# Patient Record
Sex: MALE | Race: WHITE
[De-identification: names, ages, dates, MRNs, and addresses within clinical notes are randomized per-mention and may not be internally consistent; named-entity substitution may affect disease eponyms.]

---

## 2021-03-19 ENCOUNTER — HOSPITAL ENCOUNTER (OUTPATIENT)
Dept: HOSPITAL 56 - MW.SDS | Age: 21
Discharge: HOME | End: 2021-03-19
Attending: SURGERY
Payer: SELF-PAY

## 2021-03-19 DIAGNOSIS — K61.0: Primary | ICD-10-CM

## 2021-03-19 DIAGNOSIS — Z98.890: ICD-10-CM

## 2021-03-19 DIAGNOSIS — F17.210: ICD-10-CM

## 2021-03-19 DIAGNOSIS — Z20.822: ICD-10-CM

## 2021-03-19 DIAGNOSIS — Z91.09: ICD-10-CM

## 2021-03-19 DIAGNOSIS — E66.9: ICD-10-CM

## 2021-03-19 DIAGNOSIS — Z01.812: ICD-10-CM

## 2021-03-19 DIAGNOSIS — Z91.018: ICD-10-CM

## 2021-03-19 PROCEDURE — 46050 I&D PERIANAL ABSCESS SUPFC: CPT

## 2021-03-19 PROCEDURE — 87635 SARS-COV-2 COVID-19 AMP PRB: CPT

## 2021-03-19 PROCEDURE — U0002 COVID-19 LAB TEST NON-CDC: HCPCS

## 2021-03-19 NOTE — [id number]
Post Anesthesia Note





- EVALUATION WITHIN 48HRS OF ANESTHETIC


Vital Signs in Normal Range: Yes


Patient Participated in Evaluation: Yes


Respiratory Function Stable: Yes


Airway Patent: Yes


Cardiovascular Function Stable: Yes


Hydration Status Stable: Yes


Pain Control Satisfactory: Yes


Nausea and Vomiting Control Satisfactory: Yes


Mental Status Recovered: Yes


Vital Signs: 


                                Last Vital Signs











Temp  36.2 C   03/19/21 16:45


 


Pulse  79   03/19/21 17:05


 


Resp  19   03/19/21 17:05


 


BP  116/65   03/19/21 17:05


 


Pulse Ox  96   03/19/21 17:05

## 2021-03-19 NOTE — PCM.OPNOTE
- General Post-Op/Procedure Note


Date of Surgery/Procedure: 03/19/21


Operative Procedure(s): Incision and drainage left anterior perianal abscess


Findings: 





3 x 2 x 1 cm perianal abscess on the left anterior anoderm. No evidence of a 

fistula 


Pre Op Diagnosis: Perianal abscess


Post-Op Diagnosis: same


Anesthesia Technique: General LMA


Primary Surgeon: Jaylin Montero


Output, Urine Amount: 800


EBL in mLs: 5


Condition: Good

## 2021-03-19 NOTE — PCM.PREANE
Preanesthetic Assessment





- Anesthesia/Transfusion/Family Hx


Anesthesia History: Prior Anesthesia Without Reaction


Family History of Anesthesia Reaction: No


Transfusion History: No Prior Transfusion(s)





- Physical Assessment


NPO Status Date: 03/19/21


NPO Status Time: 00:05


Vital Signs: 





                                Last Vital Signs











Temp  36.7 C   03/19/21 15:05


 


Pulse  85   03/19/21 15:05


 


Resp  16   03/19/21 15:05


 


BP  142/74 H  03/19/21 15:05


 


Pulse Ox  96   03/19/21 15:05











Height: 1.68 m


Weight: 100.698 kg


ASA Class: 1





- Lab


Values: 





                             Laboratory Last Values











SARS-CoV-2 RNA (KATHLEEN)  NEGATIVE  (NEGATIVE)   03/19/21  15:20    














- Allergies


Allergies/Adverse Reactions: 


                                    Allergies











Allergy/AdvReac Type Severity Reaction Status Date / Time


 


No Known Allergies Allergy   Verified 03/19/21 15:37














- Acknowledgements


Anesthesia Type Planned: General Anesthesia


Pt an Appropriate Candidate for the Planned Anesthesia: Yes


Alternatives and Risks of Anesthesia Discussed w Pt/Guardian: Yes


Pt/Guardian Understands and Agrees with Anesthesia Plan: Yes





PreAnesthesia Questionnaire


Gastrointestinal History: Reports: GERD


Endocrine/Metabolic History: Reports: Obesity/BMI 30+





- Past Surgical History


Head Surgeries/Procedures: Reports: None


HEENT Surgical History: Reports: Tonsillectomy, Other (See Below)


Other HEENT Surgeries/Procedures: Left Tympanoplasty





- SUBSTANCE USE


Tobacco Use Status *Q: Current Every Day Tobacco User


Tobacco Use Within Last Twelve Months: Snuff/Dip


Recreational Drug Use History: No





- HOME MEDS


Home Medications: 


                                    Home Meds





Famotidine [Pepcid AC] 20 mg PO DAILY PRN 03/19/21 [History]


metroNIDAZOLE [Flagyl] 500 mg PO Q8H #15 tab 03/19/21 [Rx]











- CURRENT (IN HOUSE) MEDS


Current Meds: 





                               Current Medications





Lactated Ringer's (Ringers, Lactated)  1,000 mls @ 125 mls/hr IV ASDIRECTED ALICIA


   Last Admin: 03/19/21 16:09 Dose:  125 mls/hr


   Documented by: 


Cefoxitin Sodium 2 gm/ Premix  50 mls @ 100 mls/hr IV ONCALL ALICIA


Sodium Chloride (Sodium Chloride 0.9% 10 Ml Syringe)  10 ml FLUSH ASDIRECTED PRN


   PRN Reason: Keep Vein Open


Sodium Chloride (Sodium Chloride 0.9% 2.5 Ml Syringe)  2.5 ml FLUSH ASDIRECTED 

PRN


   PRN Reason: Keep Vein Open


Sodium Chloride (Sodium Chloride 0.9% 10 Ml Sdv)  10 ml IV ASDIRECTED PRN


   PRN Reason: IV Use





Discontinued Medications





Cefoxitin Sodium (Cefoxitin 1 Gm Vial) Confirm Administered Dose 2 gm .ROUTE 

.STK-MED ONE


   Stop: 03/19/21 15:59


Fentanyl (Fentanyl 100 Mcg/2 Ml Sdv) Confirm Administered Dose 100 mcg .ROUTE 

.STK-MED ONE


   Stop: 03/19/21 15:46


Sodium Chloride (Normal Saline) Confirm Administered Dose 20 mls @ as directed 

.ROUTE .STK-MED ONE


   Stop: 03/19/21 15:59


Midazolam HCl (Midazolam 1 Mg/Ml 2 Ml Sdv) Confirm Administered Dose 2 mg .ROUTE

 .STK-MED ONE


   Stop: 03/19/21 15:46


Propofol (Propofol 200 Mg/20 Ml Sdv) Confirm Administered Dose 200 mg .ROUTE 

.STK-MED ONE


   Stop: 03/19/21 15:46

## 2021-11-23 NOTE — [id number]
Post Anesthesia Note





- EVALUATION WITHIN 48HRS OF ANESTHETIC


Vital Signs in Normal Range: Yes


Patient Participated in Evaluation: Yes


Respiratory Function Stable: Yes


Airway Patent: Yes


Cardiovascular Function Stable: Yes


Hydration Status Stable: Yes


Pain Control Satisfactory: Yes


Nausea and Vomiting Control Satisfactory: Yes


Mental Status Recovered: Yes


Vital Signs: 


                                Last Vital Signs











Temp  96.8 F L  11/23/21 12:35


 


Pulse  77   11/23/21 12:35


 


Resp  16   11/23/21 12:35


 


BP  138/78   11/23/21 12:35


 


Pulse Ox  98   11/23/21 12:35

## 2021-11-23 NOTE — PCM.POSTAN
POST ANESTHESIA ASSESSMENT





- MENTAL STATUS


Mental Status: Alert, Oriented





- VITAL SIGNS


Vital Signs: 


                                Last Vital Signs











Temp  96.8 F L  11/23/21 12:35


 


Pulse  77   11/23/21 12:35


 


Resp  16   11/23/21 12:35


 


BP  138/78   11/23/21 12:35


 


Pulse Ox  98   11/23/21 12:35














- RESPIRATORY


Respiratory Status: Respiratory Rate WNL, Airway Patent, O2 Saturation Stable





- CARDIOVASCULAR


CV Status: Pulse Rate WNL, Blood Pressure Stable





- GASTROINTESTINAL


GI Status: No Symptoms





- POST OP HYDRATION


Hydration Status: Adequate & Stable

## 2021-11-23 NOTE — PCM.OPNOTE
- General Post-Op/Procedure Note


Date of Surgery/Procedure: 11/23/21


Operative Procedure(s): Excision pilonidal cyst


Condition: Good

## 2021-11-23 NOTE — PCM.OPNOTE
- General Post-Op/Procedure Note


Date of Surgery/Procedure: 11/23/21


Operative Procedure(s): Excision pilonidal cyst


Findings: 





Pilonidal cyst excision 3 x 1 x 2 cm and 2.5 x 2 x 0.5 cm pieces


Pre Op Diagnosis: Pilonidal cyst


Post-Op Diagnosis: same


Anesthesia Technique: MAC


Primary Surgeon: Jaylin Montero


Condition: Good

## 2021-11-23 NOTE — PCM.PREANE
Preanesthetic Assessment





- Anesthesia/Transfusion/Family Hx


Anesthesia History: Prior Anesthesia Without Reaction


Transfusion History: No Prior Transfusion(s)





- Review of Systems


General: No Symptoms


Pulmonary: No Symptoms


Cardiovascular: No Symptoms


Gastrointestinal: No Symptoms


Neurological: No Symptoms


Other: Reports: None





- Physical Assessment


NPO Status Date: 11/23/21


NPO Status Time: 00:00


Height: 5 ft 6 in


Weight: 240 lb


Mental Status: Alert & Oriented x3


Airway Class: Mallampati = 2


Dentition: Reports: Normal Dentition


ROM/Head Extension: Full


Lungs: Clear to Auscultation, Normal Respiratory Effort


Cardiovascular: Regular Rate, Regular Rhythm





- Allergies


Allergies/Adverse Reactions: 


                                    Allergies











Allergy/AdvReac Type Severity Reaction Status Date / Time


 


animal dander Allergy  itchy/watery Verified 11/18/21 08:36





   eyes/sneezing  


 


bean Allergy  Stomach Verified 11/18/21 08:36





   Upset  


 


bee venom protein (honey bee) Allergy  Swelling Verified 11/18/21 08:36


 


[place] Allergy  watery Verified 11/18/21 08:38





   eyes/sneezing/sob  


 


grass/molds Allergy  itchy/watery Uncoded 11/18/21 08:36





   eyes,  





   sneezing  














- Acknowledgements


Anesthesia Type Planned: General Anesthesia


Pt an Appropriate Candidate for the Planned Anesthesia: Yes


Alternatives and Risks of Anesthesia Discussed w Pt/Guardian: Yes


Pt/Guardian Understands and Agrees with Anesthesia Plan: Yes





PreAnesthesia Questionnaire


HEENT History: Reports: Allergic Rhinitis


Cardiovascular History: Reports: None


Respiratory History: Reports: None


Gastrointestinal History: Reports: GERD


Genitourinary History: Reports: None


Musculoskeletal History: Reports: None


Neurological History: Reports: None


Psychiatric History: Reports: None


Endocrine/Metabolic History: Reports: Obesity/BMI 30+


Hematologic History: Reports: None


Immunologic History: Reports: None


Oncologic (Cancer) History: Reports: None


Dermatologic History: Reports: None





- Past Surgical History


Head Surgeries/Procedures: Reports: None


HEENT Surgical History: Reports: Tonsillectomy, Other (See Below)


Other HEENT Surgeries/Procedures: Left Tympanoplasty


Cardiovascular Surgical History: Reports: None


Respiratory Surgical History: Reports: None


GI Surgical History: Reports: Other (See Below)


Other GI Surgeries/Procedures: previous I&D of pilonidal cyst


Male  Surgical History: Reports: None


Endocrine Surgical History: Reports: None


Neurological Surgical History: Reports: None


Musculoskeletal Surgical History: Reports: None


Oncologic Surgical History: Reports: None


Dermatological Surgical History: Reports: None





- SUBSTANCE USE


Tobacco Use Status *Q: Light Tobacco User


Tobacco Use Within Last Twelve Months: Vaping


Recreational Drug Type: Reports: Marijuana/Hashish


Recreational Drug Last Use: smokes 1 to 2 times daily, instructed to stop 5 to 7

days prior to surgery





- HOME MEDS


Home Medications: 


                                    Home Meds





Famotidine [Pepcid] 1 tab PO ASDIRECTED PRN 11/18/21 [History]











- CURRENT (IN HOUSE) MEDS


Current Meds: 





                               Current Medications





Albuterol (Albuterol 0.083% 2.5 Mg/3 Ml Neb Soln)  2.5 mg NEB ONETIME PRN


   PRN Reason: Wheezing


Droperidol (Droperidol 5 Mg/2 Ml Sdv)  0.625 mg IVPUSH ONETIME PRN


   PRN Reason: Nausea/Vomiting


Fentanyl (Fentanyl 100 Mcg/2 Ml Sdv)  50 mcg IVPUSH Q5M PRN


   PRN Reason: Pain (mild 1-3)


Hydromorphone HCl (Hydromorphone 1 Mg/Ml Syringe)  1 mg IVPUSH Q10M PRN


   PRN Reason: Pain (moderate 4-6)


Lactated Ringer's (Ringers, Lactated)  1,000 mls @ 125 mls/hr IV ASDIRECTED ALICIA


Metoclopramide HCl (Metoclopramide 10 Mg/2 Ml Sdv)  10 mg IVPUSH ONETIME PRN


   PRN Reason: Nausea/Vomiting


Morphine Sulfate (Morphine 2 Mg/Ml Syringe)  2 mg IVPUSH Q10M PRN


   PRN Reason: Pain (severe 7-10)


Naloxone HCl (Naloxone 0.4 Mg/Ml Sdv)  0.1 mg IVPUSH ASDIRECTED PRN


   PRN Reason: Respiratory Depression


Ondansetron HCl (Ondansetron 4 Mg/2 Ml Sdv)  4 mg IVPUSH ONETIME PRN


   PRN Reason: Nausea/Vomiting


Sodium Chloride (Sodium Chloride 0.9% 2.5 Ml Syringe)  2.5 ml FLUSH ASDIRECTED 

PRN


   PRN Reason: Keep Vein Open


Sodium Chloride (Sodium Chloride 0.9% 20 Ml Sdv)  10 ml IV ASDIRECTED PRN


   PRN Reason: IV Use


Sodium Chloride (Sodium Chloride 0.9% 10 Ml Syringe)  10 ml FLUSH ASDIRECTED PRN


   PRN Reason: Keep Vein Open





Discontinued Medications





Cefazolin Sodium/Dextrose 2 gm (/ Premix)  50 mls @ 100 mls/hr IV ONETIME ONE


   Stop: 11/23/21 09:55

## 2021-11-24 NOTE — OR
SURGEON:

JAYLIN MONTERO MD

 

DATE OF PROCEDURE:  2021

 

PREOPERATIVE DIAGNOSIS:

Pilonidal cyst.

 

POSTOPERATIVE DIAGNOSIS:

Pilonidal cyst.

 

PROCEDURE PERFORMED:

Pilonidal cyst excision.

 

PRIMARY SURGEON:

Jaylin Montero MD

 

ANESTHESIA:

General LMA.

 

FLUIDS:

900 mL crystalloid.

 

ESTIMATED BLOOD LOSS:

10 mL.

 

FINDINGS:

Pilonidal cyst along the  cleft.  3 x 1 x 2 cm and 2 x 2.5 x  0.5 cm pieces

of tissue excised.

 

COMPLICATIONS:

None.

 

INDICATIONS:

The patient is a 21-year-old male who comes to clinic with complaints of

pilonidal cyst which is causing recurrent abscesses and infections.  The

decision was made to proceed to the operating room to perform an excision of the

pilonidal cyst along the twan cleft.  I explained the procedure, expected

perioperative course, the need for dressing changes afterwards, and the risks

including bleeding, infection, or damage to surrounding structures.  He

verbalized understanding and wishes to proceed.

 

PROCEDURE IN DETAIL:

The patient was brought in to the OR and placed on the OR table in supine

position.  A time-out was completed verifying the patient's name, age, date of

birth, allergies, and procedure to be performed.  General LMA anesthesia was

induced.  Once his LMA was secured, he was placed in a left lateral decubitus

position making sure to appropriately pad and protect all bony prominences.  The

lower back and upper buttock were then prepped and draped in usual standard

fashion.  I anesthetized around the pilonidal cyst with 0.5% Marcaine plain.  An

elliptical incision was made around the previous incision site as well as the

pilonidal cyst.  A 15-blade was used to make the skin incision and cautery was

used to dissect down to the level of subcutaneous fat.  I dissected down to the

fascia.  Once I reached there, I noticed an abscess cavity that was located more

superiorly.  I extended my incision on the skin and re-excised this piece of

tissue.  The first piece of tissue that I removed was labeled as pilonidal cyst.

It was 3 x 1 x 2 cm in size.  The second piece of tissue I removed was labeled

as pilonidal cyst as well but measured 2 x 2.5 x  0.5 cm in size.  These were

both sent to Pathology.  Electrocautery was used to achieve hemostasis.  I

attempted to marsupialize the wound edges using a running locking stitch, but

this was placing too much tension on the skin.  Instead, I performed interrupted

sutures around the edges of the wound to marsupialize it down to the fascia.

This was performed using 2-0 chromic suture.  The wound was then irrigated with

normal saline.  It was then packed with wet-to-dry dressing which was secured in

place using tape.  The patient tolerated the procedure well, was extubated, and

taken to PACU in stable condition.  All counts were complete and correct at the

end of the case.

 

 

GLEN / STEWART

DD:  2021 14:06:44

DT:  2021 16:03:30

Job #:  [id number]

## 2025-02-07 NOTE — OR
SURGEON:

JAYLIN MONTERO MD

 

DATE OF PROCEDURE:  03/19/2021

 

PREOPERATIVE DIAGNOSIS:

Perianal abscess.

 

POSTOPERATIVE DIAGNOSIS:

Perianal abscess.

 

PROCEDURE PERFORMED:

Incision and drainage of left anterior perianal abscess.

 

PRIMARY SURGEON:

Jaylin Montero MD

 

ANESTHESIA:

General LMA.

 

FLUIDS:

800 mL of crystalloid.

 

ESTIMATED BLOOD LOSS:

5 mL.

 

FINDINGS:

3 x 2 x 1 cm perianal abscess in the left anterior anoderm.  No evidence of a

fistula.

 

COMPLICATIONS:

None.

 

INDICATIONS:

The patient is a 21-year-old male who presented to my clinic this afternoon with

perianal pain, swelling, and warmth.  Examination revealed a large fluctuant

left anterior perianal abscess.  I explained the pathophysiology of perianal

abscesses to the patient.  I explained the need for incision and drainage.  I

discussed the possibility of a fistula and the treatment should I find one.  We

discussed the risks including bleeding, infection, or damage to surrounding

structures.  The patient verbalized understanding and wishes to proceed.

 

PROCEDURE IN DETAIL:

The patient was brought into the OR and placed on the OR table in supine

position.  A time-out was completed verifying the patient's name, age, date of

birth, allergies, and procedure to be performed.  General LMA anesthesia was

induced.  The patient was then placed in a left lateral decubitus position.  The

buttock was then prepped and draped in usual standard fashion.  A digital rectal

exam was performed.  I could feel some fluctuance in the left anterior anal

canal, but no evidence of a fistula.  A bivalve anoscope was inserted in the

rectum, again I saw no evidence of purulent drainage inside the anal canal.  In

the area of maximal fluctuance, I used a 15 blade to make a 1.5 cm incision

along the skin lines.  I immediately encountered a large amount of purulent

material.  This was suctioned out.  I then explored the wound cavity and took

down the loculations with a hemostat.  I then used a fistula probe to track

toward the anal canal.  There was no evidence of a perianal fistula.  The

abscess did track toward the midline.  Again, I saw no evidence of a midline

fistula.  The wound was then copiously irrigated with normal saline.  It was

measured and found to be 3 x 2 x 1 cm in size.  A 1-inch iodoform packing strip

was then placed inside the wound and 4 x 4 gauze placed on the outside.  These

were then secured in place using mesh underwear.  The patient tolerated the

procedure well, was extubated and taken to PACU in stable condition.  All counts

were complete and correct at the end of the case.

 

 

GLEN WILLIAM

DD:  03/19/2021 16:50:29

DT:  03/19/2021 19:32:08

Job #:  [id number] none none